# Patient Record
Sex: FEMALE | Race: WHITE | Employment: FULL TIME | URBAN - METROPOLITAN AREA
[De-identification: names, ages, dates, MRNs, and addresses within clinical notes are randomized per-mention and may not be internally consistent; named-entity substitution may affect disease eponyms.]

---

## 2018-05-29 ENCOUNTER — APPOINTMENT (OUTPATIENT)
Dept: GENERAL RADIOLOGY | Age: 33
End: 2018-05-29
Payer: COMMERCIAL

## 2018-05-29 ENCOUNTER — HOSPITAL ENCOUNTER (EMERGENCY)
Age: 33
Discharge: HOME | End: 2018-05-29
Payer: COMMERCIAL

## 2018-05-29 VITALS
HEART RATE: 82 BPM | HEIGHT: 70 IN | RESPIRATION RATE: 14 BRPM | TEMPERATURE: 98.2 F | WEIGHT: 199 LBS | OXYGEN SATURATION: 98 % | BODY MASS INDEX: 28.49 KG/M2 | SYSTOLIC BLOOD PRESSURE: 132 MMHG | DIASTOLIC BLOOD PRESSURE: 82 MMHG

## 2018-05-29 DIAGNOSIS — S63.502A LEFT WRIST SPRAIN, INITIAL ENCOUNTER: Primary | ICD-10-CM

## 2018-05-29 PROCEDURE — 73110 X-RAY EXAM OF WRIST: CPT

## 2018-05-29 PROCEDURE — 99283 EMERGENCY DEPT VISIT LOW MDM: CPT

## 2018-05-29 RX ORDER — NAPROXEN 500 MG/1
500 TABLET ORAL 2 TIMES DAILY
Qty: 10 TABLET | Refills: 0 | Status: SHIPPED | OUTPATIENT
Start: 2018-05-29 | End: 2018-11-10

## 2018-05-29 ASSESSMENT — PAIN DESCRIPTION - DESCRIPTORS: DESCRIPTORS: ACHING;CONSTANT;DISCOMFORT

## 2018-05-29 ASSESSMENT — PAIN DESCRIPTION - LOCATION: LOCATION: WRIST

## 2018-05-29 ASSESSMENT — PAIN DESCRIPTION - ORIENTATION: ORIENTATION: LEFT

## 2018-05-29 ASSESSMENT — PAIN DESCRIPTION - FREQUENCY: FREQUENCY: CONTINUOUS

## 2018-05-29 ASSESSMENT — PAIN SCALES - GENERAL: PAINLEVEL_OUTOF10: 4

## 2018-05-29 ASSESSMENT — PAIN DESCRIPTION - PAIN TYPE: TYPE: ACUTE PAIN

## 2018-10-26 ENCOUNTER — HOSPITAL ENCOUNTER (OUTPATIENT)
Dept: NON INVASIVE DIAGNOSTICS | Age: 33
Discharge: HOME | End: 2018-10-26
Payer: COMMERCIAL

## 2018-10-26 LAB
LV EF: 52 %
LVEF MODALITY: NORMAL

## 2018-10-26 PROCEDURE — 93306 TTE W/DOPPLER COMPLETE: CPT

## 2018-11-10 ENCOUNTER — APPOINTMENT (OUTPATIENT)
Dept: GENERAL RADIOLOGY | Age: 33
End: 2018-11-10
Payer: COMMERCIAL

## 2018-11-10 ENCOUNTER — HOSPITAL ENCOUNTER (EMERGENCY)
Age: 33
Discharge: HOME | End: 2018-11-10
Payer: COMMERCIAL

## 2018-11-10 VITALS
BODY MASS INDEX: 25.77 KG/M2 | TEMPERATURE: 98.5 F | HEART RATE: 90 BPM | RESPIRATION RATE: 16 BRPM | HEIGHT: 70 IN | DIASTOLIC BLOOD PRESSURE: 73 MMHG | WEIGHT: 180 LBS | OXYGEN SATURATION: 97 % | SYSTOLIC BLOOD PRESSURE: 119 MMHG

## 2018-11-10 DIAGNOSIS — S93.601A FOOT SPRAIN, RIGHT, INITIAL ENCOUNTER: Primary | ICD-10-CM

## 2018-11-10 PROCEDURE — 73630 X-RAY EXAM OF FOOT: CPT

## 2018-11-10 PROCEDURE — 99283 EMERGENCY DEPT VISIT LOW MDM: CPT

## 2018-11-10 RX ORDER — ALBUTEROL SULFATE 2.5 MG/3ML
2.5 SOLUTION RESPIRATORY (INHALATION) EVERY 6 HOURS PRN
COMMUNITY

## 2018-11-10 RX ORDER — NAPROXEN 500 MG/1
500 TABLET ORAL 2 TIMES DAILY WITH MEALS
Qty: 28 TABLET | Refills: 0 | Status: SHIPPED | OUTPATIENT
Start: 2018-11-10 | End: 2019-02-20

## 2018-11-10 ASSESSMENT — PAIN SCALES - GENERAL: PAINLEVEL_OUTOF10: 4

## 2018-11-10 ASSESSMENT — PAIN DESCRIPTION - ORIENTATION: ORIENTATION: RIGHT

## 2018-11-11 NOTE — ED PROVIDER NOTES
answered at this time and they are agreeable with the plan. Assessment      1. Foot sprain, right, initial encounter      Plan   Discharge to home  Patient condition is good    New Medications     Discharge Medication List as of 11/10/2018  8:38 PM        Electronically signed by LEIA Sage CNP   DD: 11/10/18  **This report was transcribed using voice recognition software. Every effort was made to ensure accuracy; however, inadvertent computerized transcription errors may be present.   END OF ED PROVIDER NOTE        LEIA Sage CNP  11/10/18 [id number]

## 2019-02-20 ENCOUNTER — HOSPITAL ENCOUNTER (EMERGENCY)
Age: 34
Discharge: HOME | End: 2019-02-20
Payer: COMMERCIAL

## 2019-02-20 ENCOUNTER — APPOINTMENT (OUTPATIENT)
Dept: GENERAL RADIOLOGY | Age: 34
End: 2019-02-20
Payer: COMMERCIAL

## 2019-02-20 VITALS
DIASTOLIC BLOOD PRESSURE: 58 MMHG | OXYGEN SATURATION: 98 % | HEART RATE: 76 BPM | RESPIRATION RATE: 18 BRPM | SYSTOLIC BLOOD PRESSURE: 105 MMHG | TEMPERATURE: 98.4 F

## 2019-02-20 DIAGNOSIS — S40.021A ARM CONTUSION, RIGHT, INITIAL ENCOUNTER: Primary | ICD-10-CM

## 2019-02-20 PROCEDURE — 73090 X-RAY EXAM OF FOREARM: CPT

## 2019-02-20 PROCEDURE — 99283 EMERGENCY DEPT VISIT LOW MDM: CPT

## 2019-02-20 RX ORDER — NAPROXEN 500 MG/1
500 TABLET ORAL 2 TIMES DAILY
Qty: 14 TABLET | Refills: 0 | Status: SHIPPED | OUTPATIENT
Start: 2019-02-20 | End: 2019-07-11

## 2019-02-20 ASSESSMENT — PAIN DESCRIPTION - ORIENTATION: ORIENTATION: RIGHT;LOWER

## 2019-02-20 ASSESSMENT — PAIN DESCRIPTION - PAIN TYPE: TYPE: ACUTE PAIN

## 2019-02-20 ASSESSMENT — PAIN SCALES - GENERAL: PAINLEVEL_OUTOF10: 4

## 2019-02-20 ASSESSMENT — PAIN DESCRIPTION - LOCATION: LOCATION: ARM

## 2019-05-24 ENCOUNTER — HOSPITAL ENCOUNTER (EMERGENCY)
Age: 34
Discharge: HOME | End: 2019-05-25
Payer: COMMERCIAL

## 2019-05-24 DIAGNOSIS — R06.4 HYPERVENTILATION: ICD-10-CM

## 2019-05-24 DIAGNOSIS — F41.1 ANXIETY STATE: Primary | ICD-10-CM

## 2019-05-24 PROCEDURE — 99283 EMERGENCY DEPT VISIT LOW MDM: CPT

## 2019-05-24 PROCEDURE — 96374 THER/PROPH/DIAG INJ IV PUSH: CPT

## 2019-05-24 PROCEDURE — 6360000002 HC RX W HCPCS: Performed by: NURSE PRACTITIONER

## 2019-05-24 RX ORDER — LORAZEPAM 2 MG/ML
1 INJECTION INTRAMUSCULAR ONCE
Status: COMPLETED | OUTPATIENT
Start: 2019-05-24 | End: 2019-05-24

## 2019-05-24 RX ADMIN — LORAZEPAM 1 MG: 2 INJECTION, SOLUTION INTRAMUSCULAR; INTRAVENOUS at 23:24

## 2019-05-24 ASSESSMENT — PAIN DESCRIPTION - PAIN TYPE: TYPE: ACUTE PAIN

## 2019-05-24 ASSESSMENT — PAIN DESCRIPTION - LOCATION: LOCATION: CHEST

## 2019-05-25 VITALS
DIASTOLIC BLOOD PRESSURE: 56 MMHG | SYSTOLIC BLOOD PRESSURE: 106 MMHG | RESPIRATION RATE: 16 BRPM | TEMPERATURE: 98.7 F | HEART RATE: 92 BPM | OXYGEN SATURATION: 96 % | BODY MASS INDEX: 25.83 KG/M2 | HEIGHT: 70 IN

## 2019-05-25 RX ORDER — LORAZEPAM 1 MG/1
1 TABLET ORAL EVERY 6 HOURS PRN
Qty: 12 TABLET | Refills: 0 | Status: SHIPPED | OUTPATIENT
Start: 2019-05-25 | End: 2019-05-28

## 2019-05-25 NOTE — ED NOTES
Significant other states she is having an anxiety attack.  very attentive and helping control her emotions.       Solo Lazar RN  05/24/19 3970

## 2019-05-25 NOTE — ED PROVIDER NOTES
calm down her respiratory rate returned to normal limits, vital signs are stable. She does verbalize that she feels much improved at this time, we'll discharge with Ativan by mouth and discharged to follow-up with primary doctor for further evaluation treatment treatment related to anxiety. Counseling: The emergency provider has spoken with the patient and discussed todays results, in addition to providing specific details for the plan of care and counseling regarding the diagnosis and prognosis. Questions are answered at this time and they are agreeable with the plan. Assessment      1. Anxiety state    2. Hyperventilation      Plan   Discharge to home  Patient condition is good    New Medications     New Prescriptions    LORAZEPAM (ATIVAN) 1 MG TABLET    Take 1 tablet by mouth every 6 hours as needed for Anxiety for up to 3 days. Electronically signed by LEIA Clay CNP   DD: 5/25/19  **This report was transcribed using voice recognition software. Every effort was made to ensure accuracy; however, inadvertent computerized transcription errors may be present. END OF ED PROVIDER NOTE        LEIA Clay CNP  05/25/19 0009    ATTENDING PROVIDER ATTESTATION:     Supervising Physician, on-site, available for consultation, non-participatory in the evaluation or care of this patient.            Brock Pascal DO  05/25/19 0599

## 2019-07-11 ENCOUNTER — HOSPITAL ENCOUNTER (EMERGENCY)
Age: 34
Discharge: HOME | End: 2019-07-11
Attending: EMERGENCY MEDICINE
Payer: COMMERCIAL

## 2019-07-11 VITALS
OXYGEN SATURATION: 98 % | HEART RATE: 86 BPM | TEMPERATURE: 99 F | WEIGHT: 170 LBS | SYSTOLIC BLOOD PRESSURE: 121 MMHG | HEIGHT: 70 IN | RESPIRATION RATE: 20 BRPM | DIASTOLIC BLOOD PRESSURE: 69 MMHG | BODY MASS INDEX: 24.34 KG/M2

## 2019-07-11 DIAGNOSIS — S16.1XXA ACUTE STRAIN OF NECK MUSCLE, INITIAL ENCOUNTER: Primary | ICD-10-CM

## 2019-07-11 PROCEDURE — 6360000002 HC RX W HCPCS: Performed by: EMERGENCY MEDICINE

## 2019-07-11 PROCEDURE — 6370000000 HC RX 637 (ALT 250 FOR IP): Performed by: EMERGENCY MEDICINE

## 2019-07-11 PROCEDURE — 96372 THER/PROPH/DIAG INJ SC/IM: CPT

## 2019-07-11 PROCEDURE — 99283 EMERGENCY DEPT VISIT LOW MDM: CPT

## 2019-07-11 RX ORDER — DEXAMETHASONE SODIUM PHOSPHATE 10 MG/ML
10 INJECTION INTRAMUSCULAR; INTRAVENOUS ONCE
Status: COMPLETED | OUTPATIENT
Start: 2019-07-11 | End: 2019-07-11

## 2019-07-11 RX ORDER — NAPROXEN 500 MG/1
500 TABLET ORAL 2 TIMES DAILY WITH MEALS
Qty: 40 TABLET | Refills: 0 | Status: SHIPPED | OUTPATIENT
Start: 2019-07-11 | End: 2021-09-21 | Stop reason: ALTCHOICE

## 2019-07-11 RX ORDER — ORPHENADRINE CITRATE 100 MG/1
100 TABLET, EXTENDED RELEASE ORAL 2 TIMES DAILY
Qty: 10 TABLET | Refills: 0 | Status: SHIPPED | OUTPATIENT
Start: 2019-07-11 | End: 2019-07-16

## 2019-07-11 RX ORDER — ALBUTEROL SULFATE 90 UG/1
2 AEROSOL, METERED RESPIRATORY (INHALATION) EVERY 6 HOURS PRN
COMMUNITY
End: 2021-09-21

## 2019-07-11 RX ORDER — FEXOFENADINE HCL AND PSEUDOEPHEDRINE HCI 180; 240 MG/1; MG/1
1 TABLET, EXTENDED RELEASE ORAL DAILY PRN
COMMUNITY
End: 2020-01-25 | Stop reason: ALTCHOICE

## 2019-07-11 RX ORDER — IBUPROFEN 800 MG/1
800 TABLET ORAL ONCE
Status: COMPLETED | OUTPATIENT
Start: 2019-07-11 | End: 2019-07-11

## 2019-07-11 RX ORDER — ORPHENADRINE CITRATE 30 MG/ML
60 INJECTION INTRAMUSCULAR; INTRAVENOUS ONCE
Status: COMPLETED | OUTPATIENT
Start: 2019-07-11 | End: 2019-07-11

## 2019-07-11 RX ORDER — ALBUTEROL SULFATE 90 UG/1
2 AEROSOL, METERED RESPIRATORY (INHALATION) 2 TIMES DAILY
COMMUNITY
End: 2021-09-21

## 2019-07-11 RX ORDER — LORAZEPAM 0.5 MG/1
0.5 TABLET ORAL DAILY PRN
COMMUNITY

## 2019-07-11 RX ORDER — LAMOTRIGINE 25 MG/1
25 TABLET ORAL NIGHTLY
COMMUNITY

## 2019-07-11 RX ADMIN — IBUPROFEN 800 MG: 800 TABLET, FILM COATED ORAL at 09:27

## 2019-07-11 RX ADMIN — DEXAMETHASONE SODIUM PHOSPHATE 10 MG: 10 INJECTION INTRAMUSCULAR; INTRAVENOUS at 08:43

## 2019-07-11 RX ADMIN — ORPHENADRINE CITRATE 60 MG: 30 INJECTION INTRAMUSCULAR; INTRAVENOUS at 08:43

## 2019-07-11 ASSESSMENT — ENCOUNTER SYMPTOMS
NAUSEA: 0
CONSTIPATION: 0
ABDOMINAL PAIN: 0
COUGH: 0
DIARRHEA: 0
SINUS PRESSURE: 0
RHINORRHEA: 0
SINUS PAIN: 0
SORE THROAT: 0
BACK PAIN: 0
WHEEZING: 0
PHOTOPHOBIA: 0
SHORTNESS OF BREATH: 0
VOMITING: 0

## 2019-07-11 ASSESSMENT — PAIN DESCRIPTION - LOCATION: LOCATION: NECK

## 2019-07-11 ASSESSMENT — PAIN SCALES - GENERAL
PAINLEVEL_OUTOF10: 4
PAINLEVEL_OUTOF10: 8
PAINLEVEL_OUTOF10: 8

## 2019-07-11 ASSESSMENT — PAIN DESCRIPTION - DESCRIPTORS: DESCRIPTORS: SHARP;PRESSURE

## 2019-07-11 ASSESSMENT — PAIN DESCRIPTION - FREQUENCY: FREQUENCY: CONTINUOUS

## 2019-07-11 ASSESSMENT — PAIN DESCRIPTION - PAIN TYPE: TYPE: ACUTE PAIN

## 2019-07-11 NOTE — ED PROVIDER NOTES
[id number] Patient states that she is feeling somewhat better after the Motrin. The patient has no concerning symptoms for any vascular or nerve injury at this time. She has no numbness or tingling of the extremities. She is neurologically intact. She has paraspinal muscle tenderness to palpation. She does have range of motion of the neck, just slower due to pain. No meningeal signs are present. The patient will be given naproxen and Norflex upon discharge. Patient will return if she has worsening symptoms. She is agreeable with this plan. [MS]      ED Course User Index  [MS] Claudia Lira, DO       --------------------------------------------- PAST HISTORY ---------------------------------------------  Past Medical History:  has a past medical history of Anemia, Depression with anxiety, Hypothyroid, and Thyroid disease. Past Surgical History:  has a past surgical history that includes Tonsillectomy; skin biopsy (cyst removed left wrist); Wrist Arthrocentesis; and esperanza and bso (cervix removed) (Bilateral, 10/17/2016). Social History:  reports that she quit smoking about 8 years ago. She has a 2.50 pack-year smoking history. She has never used smokeless tobacco. She reports that she drinks about 1.2 oz of alcohol per week. She reports that she does not use drugs. Family History: family history includes Arthritis in her mother; Diabetes in her mother; Heart Disease in her maternal grandfather and paternal grandfather; No Known Problems in her father. The patients home medications have been reviewed. Allergies: Patient has no known allergies. -------------------------------------------------- RESULTS -------------------------------------------------  Labs:  No results found for this visit on 07/11/19.     Radiology:  No orders to display       ------------------------- NURSING NOTES AND VITALS REVIEWED ---------------------------  Date / Time Roomed:  7/11/2019  8:15 AM  ED Bed Assignment:

## 2020-01-25 ENCOUNTER — HOSPITAL ENCOUNTER (EMERGENCY)
Age: 35
Discharge: HOME | End: 2020-01-25
Payer: COMMERCIAL

## 2020-01-25 ENCOUNTER — APPOINTMENT (OUTPATIENT)
Dept: GENERAL RADIOLOGY | Age: 35
End: 2020-01-25
Payer: COMMERCIAL

## 2020-01-25 VITALS
WEIGHT: 178 LBS | DIASTOLIC BLOOD PRESSURE: 65 MMHG | BODY MASS INDEX: 25.54 KG/M2 | OXYGEN SATURATION: 96 % | SYSTOLIC BLOOD PRESSURE: 98 MMHG | HEART RATE: 104 BPM | TEMPERATURE: 98.6 F | RESPIRATION RATE: 16 BRPM

## 2020-01-25 PROCEDURE — 99212 OFFICE O/P EST SF 10 MIN: CPT

## 2020-01-25 PROCEDURE — 71046 X-RAY EXAM CHEST 2 VIEWS: CPT

## 2020-01-25 RX ORDER — PREDNISONE 20 MG/1
40 TABLET ORAL DAILY
Qty: 10 TABLET | Refills: 0 | Status: SHIPPED | OUTPATIENT
Start: 2020-01-25 | End: 2020-01-30

## 2020-01-25 RX ORDER — FLUOXETINE HYDROCHLORIDE 20 MG/1
40 CAPSULE ORAL DAILY
COMMUNITY

## 2020-01-25 RX ORDER — BUSPIRONE HYDROCHLORIDE 10 MG/1
10 TABLET ORAL 2 TIMES DAILY
COMMUNITY

## 2020-01-25 RX ORDER — BENZONATATE 200 MG/1
200 CAPSULE ORAL 3 TIMES DAILY PRN
Qty: 21 CAPSULE | Refills: 0 | Status: SHIPPED | OUTPATIENT
Start: 2020-01-25 | End: 2020-02-01

## 2020-01-25 ASSESSMENT — PAIN SCALES - GENERAL: PAINLEVEL_OUTOF10: 5

## 2020-01-25 ASSESSMENT — PAIN DESCRIPTION - LOCATION: LOCATION: CHEST;BACK

## 2020-01-25 NOTE — ED PROVIDER NOTES
atraumatic  Eyes: clear  Mouth: Oropharynx clear, handling secretions, no trismus  Neck: Supple, full ROM, non tender to palpation in the midline, no stridor, no crepitus, no meningeal signs  Pulmonary: Lungs clear to auscultation bilaterally, no wheezes, rales, or rhonchi. Not in respiratory distress  Cardiovascular:  Regular rate. Regular rhythm. No murmurs, gallops, or rubs. 2+ distal pulse  Abdomen: Soft. Non tender. Non distended. Musculoskeletal: Moves all extremities x 4. Warm and well perfused, no clubbing, cyanosis, or edema. Capillary refill <3 seconds  Skin: warm and dry. No rashes. Neurologic: GCS 15  Psych: Normal Affect      -------------------------------------------------- RESULTS -------------------------------------------------    LABS:  No results found for this visit on 01/25/20. RADIOLOGY:  Interpreted by Radiologist.  XR CHEST STANDARD (2 VW)   Final Result   1. No acute cardiopulmonary disease.            ------------------------- NURSING NOTES AND VITALS REVIEWED ---------------------------   The nursing notes within the ED encounter and vital signs as below have been reviewed. BP 98/65   Pulse 104   Temp 98.6 °F (37 °C) (Oral)   Resp 16   Wt 178 lb (80.7 kg)   LMP 09/10/2016 (Approximate)   SpO2 96%   BMI 25.54 kg/m²   Oxygen Saturation Interpretation: Normal    The patients available past medical records and past encounters were reviewed. ------------------------------ ED COURSE/MEDICAL DECISION MAKING----------------------  Medications - No data to display          Medical Decision Making:    States that she has been achy and tired and has a slight cough for about a week but then last 3 days it seems to have gotten worse and she feels she has chest congestion and it feels tighter. She wondered 1 to make sure she did not have any pneumonia she is not complaining of shortness of breath.   She has been taking DayQuil and NyQuil.'s are totally clear her sats 96% I did do a chest x-ray and it was negative. Her blood pressure was rechecked it was 122/84, her heart rate is 104 but she does not have a fever does not appear septic she appears well and her exam is negative. Most likely she had and she had flulike symptoms over the past week she is too late for Tamiflu she does have a cough feels her chest is congested I did go ahead and put her on some prednisone and some Tessalon. I advised her to follow-up with her doctor if anything worsens she needs to get reevaluated  Counseling: The emergency provider has spoken with the patient and discussed todays results, in addition to providing specific details for the plan of care and counseling regarding the diagnosis and prognosis. Questions are answered at this time and they are agreeable with the plan.       --------------------------------- IMPRESSION AND DISPOSITION ---------------------------------    IMPRESSION  1. Flu-like symptoms    2.  Cough        DISPOSITION  Disposition: Discharge to home  Patient condition is good           LEIA Ferro CNP  01/25/20 1364

## 2020-07-15 ENCOUNTER — HOSPITAL ENCOUNTER (EMERGENCY)
Age: 35
Discharge: HOME | End: 2020-07-15
Payer: COMMERCIAL

## 2020-07-15 ENCOUNTER — HOSPITAL ENCOUNTER (EMERGENCY)
Age: 35
Discharge: HOME | End: 2020-07-15
Attending: EMERGENCY MEDICINE
Payer: COMMERCIAL

## 2020-07-15 VITALS
HEIGHT: 70 IN | WEIGHT: 178 LBS | BODY MASS INDEX: 25.48 KG/M2 | TEMPERATURE: 98.9 F | RESPIRATION RATE: 16 BRPM | SYSTOLIC BLOOD PRESSURE: 113 MMHG | HEART RATE: 96 BPM | OXYGEN SATURATION: 99 % | DIASTOLIC BLOOD PRESSURE: 68 MMHG

## 2020-07-15 LAB
HCG, URINE, POC: NEGATIVE
Lab: NORMAL
NEGATIVE QC PASS/FAIL: NORMAL
POSITIVE QC PASS/FAIL: NORMAL

## 2020-07-15 PROCEDURE — 99284 EMERGENCY DEPT VISIT MOD MDM: CPT

## 2020-07-15 PROCEDURE — 6370000000 HC RX 637 (ALT 250 FOR IP): Performed by: EMERGENCY MEDICINE

## 2020-07-15 PROCEDURE — 6360000002 HC RX W HCPCS: Performed by: EMERGENCY MEDICINE

## 2020-07-15 RX ORDER — KETOROLAC TROMETHAMINE 30 MG/ML
30 INJECTION, SOLUTION INTRAMUSCULAR; INTRAVENOUS ONCE
Status: COMPLETED | OUTPATIENT
Start: 2020-07-15 | End: 2020-07-15

## 2020-07-15 RX ORDER — HYDROCODONE BITARTRATE AND ACETAMINOPHEN 5; 325 MG/1; MG/1
1 TABLET ORAL ONCE
Status: COMPLETED | OUTPATIENT
Start: 2020-07-15 | End: 2020-07-15

## 2020-07-15 RX ORDER — ORPHENADRINE CITRATE 30 MG/ML
60 INJECTION INTRAMUSCULAR; INTRAVENOUS ONCE
Status: COMPLETED | OUTPATIENT
Start: 2020-07-15 | End: 2020-07-15

## 2020-07-15 RX ORDER — LIDOCAINE 50 MG/G
1 PATCH TOPICAL DAILY
Qty: 10 PATCH | Refills: 0 | Status: SHIPPED | OUTPATIENT
Start: 2020-07-15 | End: 2020-07-25

## 2020-07-15 RX ORDER — PREDNISONE 50 MG/1
50 TABLET ORAL DAILY
Qty: 5 TABLET | Refills: 0 | Status: SHIPPED | OUTPATIENT
Start: 2020-07-15 | End: 2020-07-20

## 2020-07-15 RX ORDER — DEXAMETHASONE 1 MG
TABLET ORAL
Status: DISCONTINUED
Start: 2020-07-15 | End: 2020-07-16 | Stop reason: HOSPADM

## 2020-07-15 RX ORDER — CYCLOBENZAPRINE HCL 5 MG
5 TABLET ORAL 2 TIMES DAILY PRN
Qty: 10 TABLET | Refills: 0 | Status: SHIPPED | OUTPATIENT
Start: 2020-07-15 | End: 2020-07-25

## 2020-07-15 RX ADMIN — KETOROLAC TROMETHAMINE 30 MG: 30 INJECTION, SOLUTION INTRAMUSCULAR; INTRAVENOUS at 21:08

## 2020-07-15 RX ADMIN — DEXAMETHASONE 10 MG: 4 TABLET ORAL at 21:07

## 2020-07-15 RX ADMIN — HYDROCODONE BITARTRATE AND ACETAMINOPHEN 1 TABLET: 5; 325 TABLET ORAL at 22:01

## 2020-07-15 RX ADMIN — ORPHENADRINE CITRATE 60 MG: 30 INJECTION INTRAMUSCULAR; INTRAVENOUS at 21:07

## 2020-07-15 ASSESSMENT — PAIN SCALES - GENERAL
PAINLEVEL_OUTOF10: 7
PAINLEVEL_OUTOF10: 7
PAINLEVEL_OUTOF10: 10
PAINLEVEL_OUTOF10: 7

## 2020-07-15 ASSESSMENT — PAIN DESCRIPTION - LOCATION
LOCATION: BACK
LOCATION: BACK

## 2020-07-15 ASSESSMENT — PAIN DESCRIPTION - ONSET: ONSET: ON-GOING

## 2020-07-15 ASSESSMENT — PAIN DESCRIPTION - PROGRESSION: CLINICAL_PROGRESSION: GRADUALLY IMPROVING

## 2020-07-15 ASSESSMENT — PAIN DESCRIPTION - ORIENTATION
ORIENTATION: LOWER
ORIENTATION: LOWER;MID

## 2020-07-15 ASSESSMENT — PAIN DESCRIPTION - DESCRIPTORS: DESCRIPTORS: CONSTANT;DISCOMFORT

## 2020-07-15 ASSESSMENT — PAIN DESCRIPTION - FREQUENCY: FREQUENCY: CONTINUOUS

## 2020-07-15 ASSESSMENT — ENCOUNTER SYMPTOMS
BACK PAIN: 1
COUGH: 0
ABDOMINAL PAIN: 0

## 2020-07-15 ASSESSMENT — PAIN DESCRIPTION - PAIN TYPE
TYPE: ACUTE PAIN
TYPE: ACUTE PAIN

## 2020-07-16 NOTE — ED NOTES
Was at a exercise facility and EMS stated she fell onto back and hurting left leg. She arrived by ambulance and placed into bed.       Mohit Lees RN  07/15/20 2027

## 2020-07-16 NOTE — ED NOTES
Was attempting sit up, while doing so had lower lumbar pain, left hip pain into left thigh and to lower thoracic area, making her fall backwards. Unable to get up independently with EMS. Arrived per squad and transferred patient over. When instructed for urine sample we were going to attempt to it to bedside to see how she was doing and was unable to get to sitting position with my help. We did place a bedpan for urine collection. Denies any numbness or tingling to extremity. Skin warm to touch. Pedal pulse strong to LLE.      Dong Call RN  07/15/20 6284

## 2020-07-16 NOTE — ED PROVIDER NOTES
This is a 38year old female who presents to the ED for evaluation of back pain. The patient states that earlier today she was at [employer] and was doing kick and sit ups. She states that after doing her sit ups she developed some acute pain to her right lower back. She denies any trauma or falls. She states that this felt like a pressure sensation and tear. She states that the pain was so sever she had to lay on the ground. She states that her pain is a 10/10. She has no nausea, no vomiting and no abdominal pain. She states that moving makes her pain worse. She states that she has no numbness, no loss of bladder incontinence. The patient has no fevers or chills. She has no use of IV drugs. The history is provided by the patient. No  was used. Review of Systems   Constitutional: Negative for fever. HENT: Negative for congestion. Eyes: Negative for visual disturbance. Respiratory: Negative for cough. Cardiovascular: Negative for chest pain. Gastrointestinal: Negative for abdominal pain. Endocrine: Negative for polyuria. Genitourinary: Negative for dysuria. Musculoskeletal: Positive for back pain. Skin: Negative for rash. Neurological: Negative for headaches. Hematological: Does not bruise/bleed easily. Psychiatric/Behavioral: Negative for confusion. Physical Exam  Vitals signs and nursing note reviewed. Constitutional:       General: She is not in acute distress. Appearance: She is well-developed. HENT:      Head: Normocephalic and atraumatic. Eyes:      Extraocular Movements: Extraocular movements intact. Neck:      Musculoskeletal: Normal range of motion and neck supple. Vascular: No JVD. Cardiovascular:      Rate and Rhythm: Normal rate and regular rhythm. Heart sounds: No murmur. Pulmonary:      Effort: Pulmonary effort is normal.      Breath sounds: No wheezing, rhonchi or rales.    Chest:      Chest wall: No tenderness. Abdominal:      General: There is no distension. Palpations: Abdomen is soft. Tenderness: There is no abdominal tenderness. There is no guarding. Hernia: No hernia is present. Musculoskeletal:      Right lower leg: No edema. Left lower leg: No edema. Comments: Right lower lumbar paraspinal hypertonicity and tenderness, limited ROM secondary to pain. No midline c-spine, t-spine or l-spine tenderness to palpation or stepoffs. No hip tenderness to palpation bilaterally or instability. Full ROM of bilateral upper and lower extremities   Skin:     General: Skin is warm and dry. Capillary Refill: Capillary refill takes less than 2 seconds. Neurological:      General: No focal deficit present. Mental Status: She is alert and oriented to person, place, and time. Cranial Nerves: No cranial nerve deficit. Psychiatric:         Mood and Affect: Mood normal.         Behavior: Behavior normal.          Procedures     MDM  Number of Diagnoses or Management Options  Acute left-sided low back pain, unspecified whether sciatica present:   Diagnosis management comments: Patient presented to the ED for evaluation of acute left low back pain after she was going [employer] and sit ups. The patient did appear quite uncomfortable and had pain that was reproducible to her left lower lumbar spine. She was treated with anti inflammatories, steroids and muscle relaxants. The patient did have improvement and was able to move around more. Patient had no red flag symptoms such as trauma, midline pain, incontinence or saddle anesthesia concerning for emergent pathology. She was appropriate for discharge and was given steroids and muscle relaxants for home. She was given strict return precautions.  She was agreeable to plan.                       --------------------------------------------- PAST HISTORY ---------------------------------------------  Past Medical History:  has a past medical providing specific details for the plan of care and counseling regarding the diagnosis and prognosis. Their questions are answered at this time and they are agreeable with the plan. I discussed at length with them reasons for immediate return here for re evaluation. They will followup with their PCP.      --------------------------------- ADDITIONAL PROVIDER NOTES ---------------------------------  At this time the patient is without objective evidence of an acute process requiring hospitalization or inpatient management. They have remained hemodynamically stable throughout their entire ED visit and are stable for discharge with outpatient follow-up. The plan has been discussed in detail and they are aware of the specific conditions for emergent return, as well as the importance of follow-up. Discharge Medication List as of 7/15/2020 10:10 PM      START taking these medications    Details   predniSONE (DELTASONE) 50 MG tablet Take 1 tablet by mouth daily for 5 days, Disp-5 tablet,R-0Print      cyclobenzaprine (FLEXERIL) 5 MG tablet Take 1 tablet by mouth 2 times daily as needed for Muscle spasms, Disp-10 tablet,R-0Print      lidocaine (LIDODERM) 5 % Place 1 patch onto the skin daily for 10 days 12 hours on, 12 hours off., Disp-10 patch,R-0Print             Diagnosis:  1. Acute left-sided low back pain, unspecified whether sciatica present        Disposition:  Patient's disposition: Discharge to home  Patient's condition is stable.      Harvie Hammans,   Resident  07/16/20 1002

## 2021-04-29 ENCOUNTER — APPOINTMENT (OUTPATIENT)
Dept: GENERAL RADIOLOGY | Age: 36
End: 2021-04-29
Payer: COMMERCIAL

## 2021-04-29 ENCOUNTER — HOSPITAL ENCOUNTER (EMERGENCY)
Age: 36
Discharge: HOME | End: 2021-04-29
Payer: COMMERCIAL

## 2021-04-29 VITALS
TEMPERATURE: 98 F | OXYGEN SATURATION: 95 % | SYSTOLIC BLOOD PRESSURE: 150 MMHG | DIASTOLIC BLOOD PRESSURE: 72 MMHG | RESPIRATION RATE: 18 BRPM | HEART RATE: 100 BPM

## 2021-04-29 DIAGNOSIS — S83.92XA SPRAIN OF LEFT KNEE, UNSPECIFIED LIGAMENT, INITIAL ENCOUNTER: Primary | ICD-10-CM

## 2021-04-29 PROCEDURE — 73562 X-RAY EXAM OF KNEE 3: CPT

## 2021-04-29 PROCEDURE — 99283 EMERGENCY DEPT VISIT LOW MDM: CPT

## 2021-04-29 ASSESSMENT — PAIN DESCRIPTION - ORIENTATION: ORIENTATION: LEFT

## 2021-04-29 ASSESSMENT — PAIN DESCRIPTION - PROGRESSION: CLINICAL_PROGRESSION: NOT CHANGED

## 2021-04-29 ASSESSMENT — PAIN DESCRIPTION - DESCRIPTORS: DESCRIPTORS: ACHING

## 2021-04-29 ASSESSMENT — ENCOUNTER SYMPTOMS
SHORTNESS OF BREATH: 0
COUGH: 0
BACK PAIN: 0
CHEST TIGHTNESS: 0
COLOR CHANGE: 0

## 2021-04-29 ASSESSMENT — PAIN DESCRIPTION - FREQUENCY: FREQUENCY: CONTINUOUS

## 2021-04-29 ASSESSMENT — PAIN DESCRIPTION - LOCATION: LOCATION: KNEE

## 2021-04-29 ASSESSMENT — PAIN DESCRIPTION - PAIN TYPE: TYPE: ACUTE PAIN

## 2021-04-29 NOTE — ED NOTES
FIRST PROVIDER CONTACT ASSESSMENT NOTE      Department of Emergency Medicine   Admit Date: No admission date for patient encounter.     Chief Complaint: Knee Pain (left knee, fall in shower 2 days ago)      History of Present Illness:    Micheal Johnson is a 38 y.o. female who presents to the ED for left knee pain s/p fall          -----------------END OF FIRST PROVIDER CONTACT ASSESSMENT NOTE--------------  Electronically signed by Cathy Calero PA-C   DD: 4/29/21               Cathy Calero PA-C  04/29/21 1828

## 2021-04-30 NOTE — ED PROVIDER NOTES
[facility]        Department of Emergency Medicine   ED  Provider Note  Admit Date/RoomTime: 4/29/2021  7:35 PM  ED Room: [place] [address]/INT-02  HPI:  4/29/21, Time: 8:16 PM EDT      The patient is a 48-year-old female presenting to the emergency department pain and swelling to her left knee. Patient states she slipped and fell out of the shower landing directly on her left knee and hitting the back of it off the edge of the shower about 2 days ago. She has had pain to the back of her knee that radiates into the front. The pain is worse with ambulating. She has taken Tylenol and ibuprofen without much relief. Patient denies hitting her head or any LOC. She also denies any significant swelling, redness, warmth, numbness or tingling, neck or back pain. The history is provided by the patient. No  was used. REVIEW OF SYSTEMS:  Review of Systems   Constitutional: Negative for activity change, chills, fatigue and fever. Respiratory: Negative for cough, chest tightness and shortness of breath. Cardiovascular: Negative for chest pain, palpitations and leg swelling. Musculoskeletal: Positive for arthralgias. Negative for back pain, joint swelling, myalgias, neck pain and neck stiffness. Skin: Negative for color change, pallor and rash. Neurological: Negative for dizziness, weakness, light-headedness and headaches. Psychiatric/Behavioral: Negative for agitation, behavioral problems and confusion. Pertinent positives and negatives are stated within HPI, all other systems reviewed and are negative.      --------------------------------------------- PAST HISTORY ---------------------------------------------  Past Medical History:  has a past medical history of Anemia, Asthma, Depression with anxiety, Hypothyroid, and Thyroid disease. Past Surgical History:  has a past surgical history that includes Tonsillectomy; skin biopsy (cyst removed left wrist);  Wrist Cardiovascular:      Rate and Rhythm: Normal rate and regular rhythm. Heart sounds: Normal heart sounds. No murmur. Pulmonary:      Effort: Pulmonary effort is normal. No respiratory distress. Breath sounds: Normal breath sounds. Musculoskeletal: Normal range of motion. General: Tenderness present. No deformity. Comments: Tenderness over the anterior left patella. Aging ecchymosis to the lateral inferior knee. Full range of motion of the knee. No crepitus or deformity. No significant edema. Skin:     General: Skin is warm and dry. Capillary Refill: Capillary refill takes less than 2 seconds. Coloration: Skin is not pale. Findings: No erythema. Neurological:      Mental Status: She is alert and oriented to person, place, and time. Mental status is at baseline. Motor: No weakness. Psychiatric:         Mood and Affect: Mood normal.         Behavior: Behavior normal.         Thought Content: Thought content normal.            ------------------------------ ED COURSE/MEDICAL DECISION MAKING----------------------  Medications - No data to display      ED COURSE:      XR KNEE LEFT (3 VIEWS)   Final Result   No acute abnormality of the knee. Procedures:  Procedures     Medical Decision Making:   MDM   77-year-old female presenting to the ED with an injury to her left knee after falling 2 days ago. Patient has no signs of neurovascular compromise. X-ray shows no acute deformity, fracture or dislocation. This is likely knee sprain/contusion. Patient placed in an Ace wrap. She is encouraged to follow-up with orthopedics and educated on rice. She is to return with any new or worsening symptoms. Counseling: The emergency provider has spoken with the patient and discussed todays results, in addition to providing specific details for the plan of care and counseling regarding the diagnosis and prognosis.   Questions are answered at this time and

## 2021-09-12 ENCOUNTER — HOSPITAL ENCOUNTER (EMERGENCY)
Age: 36
Discharge: HOME | End: 2021-09-12
Payer: COMMERCIAL

## 2021-09-12 VITALS
BODY MASS INDEX: 28.7 KG/M2 | TEMPERATURE: 98.4 F | RESPIRATION RATE: 16 BRPM | DIASTOLIC BLOOD PRESSURE: 78 MMHG | WEIGHT: 200 LBS | OXYGEN SATURATION: 97 % | HEART RATE: 100 BPM | SYSTOLIC BLOOD PRESSURE: 118 MMHG

## 2021-09-12 DIAGNOSIS — J20.9 ACUTE BRONCHITIS, UNSPECIFIED ORGANISM: Primary | ICD-10-CM

## 2021-09-12 DIAGNOSIS — J06.9 ACUTE UPPER RESPIRATORY INFECTION: ICD-10-CM

## 2021-09-12 PROCEDURE — 99211 OFF/OP EST MAY X REQ PHY/QHP: CPT

## 2021-09-12 RX ORDER — AMOXICILLIN AND CLAVULANATE POTASSIUM 875; 125 MG/1; MG/1
1 TABLET, FILM COATED ORAL 2 TIMES DAILY
Qty: 14 TABLET | Refills: 0 | Status: SHIPPED | OUTPATIENT
Start: 2021-09-12 | End: 2021-09-19

## 2021-09-12 RX ORDER — BROMPHENIRAMINE MALEATE, PSEUDOEPHEDRINE HYDROCHLORIDE, AND DEXTROMETHORPHAN HYDROBROMIDE 2; 30; 10 MG/5ML; MG/5ML; MG/5ML
10 SYRUP ORAL 3 TIMES DAILY PRN
Qty: 120 ML | Refills: 0 | Status: SHIPPED | OUTPATIENT
Start: 2021-09-12 | End: 2021-09-21 | Stop reason: ALTCHOICE

## 2021-09-12 RX ORDER — PREDNISONE 20 MG/1
TABLET ORAL
Qty: 8 TABLET | Refills: 0 | Status: SHIPPED | OUTPATIENT
Start: 2021-09-12 | End: 2021-09-21 | Stop reason: ALTCHOICE

## 2021-09-12 NOTE — ED PROVIDER NOTES
[id number]  Department of Emergency Medicine  UC Encounter Note  21   2:28 PM EDT      NAME: Jamarcus Posey  :  1985  MRN:  [id number]    Chief Complaint: Sinusitis (cough, congestion, drainage, sore throat, feels like its not open all the way, stuffy, started yesterday, feels jittery)      This is a 65-year-old female the presents to urgent care complaining of sinus and upper respiratory symptoms for the past several days. There has been some cough and congestion and sinus drainage. Also complains of a sore throat. On first contact patient she appears to be in no acute distress. Review of Systems  Pertinent positives and negatives are stated within HPI, all other systems reviewed and are negative. Physical Exam  Vitals and nursing note reviewed. Constitutional:       Appearance: She is well-developed. HENT:      Head: Normocephalic and atraumatic. Jaw: There is normal jaw occlusion. Right Ear: Hearing and external ear normal. Tympanic membrane is bulging. Left Ear: Hearing and external ear normal. Tympanic membrane is bulging. Nose: Congestion and rhinorrhea present. Mouth/Throat:      Pharynx: Uvula midline. No pharyngeal swelling, oropharyngeal exudate or uvula swelling. Eyes:      General: Lids are normal.      Conjunctiva/sclera: Conjunctivae normal.      Pupils: Pupils are equal, round, and reactive to light. Cardiovascular:      Rate and Rhythm: Normal rate and regular rhythm. Heart sounds: Normal heart sounds. No murmur heard. Pulmonary:      Effort: Pulmonary effort is normal.      Breath sounds: Normal breath sounds. Abdominal:      General: Bowel sounds are normal.      Palpations: Abdomen is soft. Abdomen is not rigid. Tenderness: There is no abdominal tenderness. There is no guarding or rebound. Musculoskeletal:      Cervical back: Normal range of motion and neck supple.    Skin:     General: Skin is warm and dry. Findings: No abrasion or rash. Neurological:      Mental Status: She is alert and oriented to person, place, and time. GCS: GCS eye subscore is 4. GCS verbal subscore is 5. GCS motor subscore is 6. Cranial Nerves: Cranial nerves are intact. No cranial nerve deficit. Sensory: Sensation is intact. No sensory deficit. Motor: Motor function is intact. Coordination: Coordination is intact. Coordination normal.      Gait: Gait is intact. Gait normal.         Procedures    MDM  Number of Diagnoses or Management Options  Acute bronchitis, unspecified organism  Acute upper respiratory infection  Diagnosis management comments: No acute distress. Placed on medications. Instructions given.           --------------------------------------------- PAST HISTORY ---------------------------------------------  Past Medical History:  has a past medical history of Anemia, Asthma, Depression with anxiety, Hussain-Danlos syndrome, Hypothyroid, and Thyroid disease. Past Surgical History:  has a past surgical history that includes Tonsillectomy; skin biopsy (cyst removed left wrist); Wrist Arthrocentesis; and esperanza and bso (cervix removed) (Bilateral, 10/17/2016). Social History:  reports that she quit smoking about 10 years ago. She has a 2.50 pack-year smoking history. She has never used smokeless tobacco. She reports current alcohol use of about 2.0 standard drinks of alcohol per week. She reports that she does not use drugs. Family History: family history includes Arthritis in her mother; Diabetes in her mother; Heart Disease in her maternal grandfather and paternal grandfather; No Known Problems in her father. The patients home medications have been reviewed. Allergies: Patient has no known allergies. -------------------------------------------------- RESULTS -------------------------------------------------  No results found for this visit on 09/12/21.   No orders to display       ------------------------- NURSING NOTES AND VITALS REVIEWED ---------------------------   The nursing notes within the ED encounter and vital signs as below have been reviewed. /78   Pulse 100   Temp 98.4 °F (36.9 °C)   Resp 16   Wt 200 lb (90.7 kg)   LMP 09/10/2016 (Approximate)   SpO2 97%   BMI 28.70 kg/m²   Oxygen Saturation Interpretation: Normal      ------------------------------------------ PROGRESS NOTES ------------------------------------------   I have spoken with the patient and discussed todays results, in addition to providing specific details for the plan of care and counseling regarding the diagnosis and prognosis. Their questions are answered at this time and they are agreeable with the plan.      --------------------------------- ADDITIONAL PROVIDER NOTES ---------------------------------     This patient is stable for discharge. I have shared the specific conditions for return, as well as the importance of follow-up. * NOTE: This report was transcribed using voice recognition software. Every effort was made to ensure accuracy; however, inadvertent computerized transcription errors may be present.    --------------------------------- IMPRESSION AND DISPOSITION ---------------------------------    IMPRESSION  1. Acute bronchitis, unspecified organism    2.  Acute upper respiratory infection        DISPOSITION  Disposition: Discharge to home  Patient condition is good       Joshua López PA-C  09/12/21 7998

## 2021-09-21 ENCOUNTER — APPOINTMENT (OUTPATIENT)
Dept: GENERAL RADIOLOGY | Age: 36
End: 2021-09-21
Payer: COMMERCIAL

## 2021-09-21 ENCOUNTER — HOSPITAL ENCOUNTER (EMERGENCY)
Age: 36
Discharge: HOME | End: 2021-09-21
Payer: COMMERCIAL

## 2021-09-21 VITALS
TEMPERATURE: 98.7 F | WEIGHT: 214 LBS | OXYGEN SATURATION: 98 % | HEIGHT: 70 IN | RESPIRATION RATE: 20 BRPM | BODY MASS INDEX: 30.64 KG/M2 | DIASTOLIC BLOOD PRESSURE: 74 MMHG | SYSTOLIC BLOOD PRESSURE: 111 MMHG | HEART RATE: 100 BPM

## 2021-09-21 DIAGNOSIS — J20.9 ACUTE BRONCHITIS, UNSPECIFIED ORGANISM: Primary | ICD-10-CM

## 2021-09-21 DIAGNOSIS — Z11.52 ENCOUNTER FOR SCREENING FOR COVID-19: ICD-10-CM

## 2021-09-21 PROCEDURE — 71046 X-RAY EXAM CHEST 2 VIEWS: CPT

## 2021-09-21 PROCEDURE — U0005 INFEC AGEN DETEC AMPLI PROBE: HCPCS

## 2021-09-21 PROCEDURE — 99211 OFF/OP EST MAY X REQ PHY/QHP: CPT

## 2021-09-21 PROCEDURE — U0003 INFECTIOUS AGENT DETECTION BY NUCLEIC ACID (DNA OR RNA); SEVERE ACUTE RESPIRATORY SYNDROME CORONAVIRUS 2 (SARS-COV-2) (CORONAVIRUS DISEASE [COVID-19]), AMPLIFIED PROBE TECHNIQUE, MAKING USE OF HIGH THROUGHPUT TECHNOLOGIES AS DESCRIBED BY CMS-2020-01-R: HCPCS

## 2021-09-21 RX ORDER — GUAIFENESIN AND CODEINE PHOSPHATE 100; 10 MG/5ML; MG/5ML
10 SOLUTION ORAL 2 TIMES DAILY PRN
Qty: 60 ML | Refills: 0 | Status: SHIPPED | OUTPATIENT
Start: 2021-09-21 | End: 2021-09-24

## 2021-09-21 RX ORDER — BENZONATATE 200 MG/1
200 CAPSULE ORAL 3 TIMES DAILY PRN
Qty: 15 CAPSULE | Refills: 0 | Status: SHIPPED | OUTPATIENT
Start: 2021-09-21

## 2021-09-21 RX ORDER — ALBUTEROL SULFATE 90 UG/1
2 AEROSOL, METERED RESPIRATORY (INHALATION) EVERY 6 HOURS PRN
Qty: 1 EACH | Refills: 0 | Status: SHIPPED | OUTPATIENT
Start: 2021-09-21

## 2021-09-21 ASSESSMENT — PAIN DESCRIPTION - LOCATION: LOCATION: GENERALIZED;CHEST

## 2021-09-21 ASSESSMENT — PAIN DESCRIPTION - DESCRIPTORS: DESCRIPTORS: ACHING;SORE

## 2021-09-21 ASSESSMENT — PAIN DESCRIPTION - ONSET: ONSET: GRADUAL

## 2021-09-21 ASSESSMENT — PAIN DESCRIPTION - PAIN TYPE: TYPE: ACUTE PAIN

## 2021-09-21 ASSESSMENT — PAIN SCALES - GENERAL: PAINLEVEL_OUTOF10: 5

## 2021-09-21 ASSESSMENT — PAIN DESCRIPTION - PROGRESSION: CLINICAL_PROGRESSION: GRADUALLY WORSENING

## 2021-09-21 ASSESSMENT — PAIN DESCRIPTION - FREQUENCY: FREQUENCY: CONTINUOUS

## 2021-09-21 NOTE — ED PROVIDER NOTES
[address] [facility]  Department of Emergency Medicine  UC Encounter Note  21   1:26 PM EDT      NAME: Taylor Coulter  :  1985  MRN:  [id number]    Chief Complaint: Nasal Congestion (States she was seen here on 2021 and treated for Bronchitis. States she finished her medications but is not feeling better.), Cough, Chest Congestion, Generalized Body Aches, and Shortness of Breath      HPI    Review of Systems  Pertinent positives and negatives are stated within HPI, all other systems reviewed and are negative. Physical Exam  Vitals and nursing note reviewed. Constitutional:       Appearance: She is well-developed. HENT:      Head: Normocephalic and atraumatic. Right Ear: Hearing and external ear normal.      Left Ear: Hearing and external ear normal.      Nose: Nose normal.      Mouth/Throat:      Pharynx: Uvula midline. Eyes:      General: Lids are normal.      Conjunctiva/sclera: Conjunctivae normal.      Pupils: Pupils are equal, round, and reactive to light. Cardiovascular:      Rate and Rhythm: Normal rate and regular rhythm. Heart sounds: Normal heart sounds. No murmur heard. Pulmonary:      Effort: Pulmonary effort is normal.      Breath sounds: Normal breath sounds. Abdominal:      General: Bowel sounds are normal.      Palpations: Abdomen is soft. Abdomen is not rigid. Tenderness: There is no abdominal tenderness. There is no guarding or rebound. Musculoskeletal:      Cervical back: Normal range of motion and neck supple. Skin:     General: Skin is warm and dry. Findings: No abrasion or rash. Neurological:      Mental Status: She is alert and oriented to person, place, and time. GCS: GCS eye subscore is 4. GCS verbal subscore is 5. GCS motor subscore is 6. Cranial Nerves: No cranial nerve deficit. Sensory: No sensory deficit.       Coordination: Coordination normal.      Gait: Gait normal. Procedures    MDM  Number of Diagnoses or Management Options  Acute bronchitis, unspecified organism  Encounter for screening for COVID-19  Diagnosis management comments: Xray reviewed  covid pending  meds discussed  Note for work  Instructions given.            --------------------------------------------- PAST HISTORY ---------------------------------------------  Past Medical History:  has a past medical history of Anemia, Asthma, Depression with anxiety, Hussain-Danlos syndrome, Hypothyroid, and Thyroid disease. Past Surgical History:  has a past surgical history that includes Tonsillectomy; skin biopsy (cyst removed left wrist); Wrist Arthrocentesis; esperanza and bso (cervix removed) (Bilateral, 10/17/2016); and Hysterectomy. Social History:  reports that she quit smoking about 10 years ago. She has a 2.50 pack-year smoking history. She has never used smokeless tobacco. She reports current alcohol use of about 2.0 standard drinks of alcohol per week. She reports that she does not use drugs. Family History: family history includes Arthritis in her mother; Diabetes in her mother; Heart Disease in her maternal grandfather and paternal grandfather; No Known Problems in her father. The patients home medications have been reviewed. Allergies: Patient has no known allergies. -------------------------------------------------- RESULTS -------------------------------------------------  No results found for this visit on 09/21/21. XR CHEST (2 VW)   Final Result   No pneumonia or pleural effusion.             ------------------------- NURSING NOTES AND VITALS REVIEWED ---------------------------   The nursing notes within the ED encounter and vital signs as below have been reviewed.    /74   Pulse 100   Temp 98.7 °F (37.1 °C) (Infrared)   Resp 20   Ht 5' 10\" (1.778 m)   Wt 214 lb (97.1 kg)   LMP 09/10/2016 (Approximate)   SpO2 98%   BMI 30.71 kg/m²   Oxygen Saturation Interpretation: Normal      ------------------------------------------ PROGRESS NOTES ------------------------------------------   I have spoken with the patient and discussed todays results, in addition to providing specific details for the plan of care and counseling regarding the diagnosis and prognosis. Their questions are answered at this time and they are agreeable with the plan.      --------------------------------- ADDITIONAL PROVIDER NOTES ---------------------------------     This patient is stable for discharge. I have shared the specific conditions for return, as well as the importance of follow-up. * NOTE: This report was transcribed using voice recognition software. Every effort was made to ensure accuracy; however, inadvertent computerized transcription errors may be present.    --------------------------------- IMPRESSION AND DISPOSITION ---------------------------------    IMPRESSION  1. Acute bronchitis, unspecified organism    2.  Encounter for screening for COVID-19        DISPOSITION  Disposition: Discharge to home  Patient condition is good       Rocky Borjas PA-C  09/21/21 5088

## 2021-09-23 LAB — SARS-COV-2, PCR: NOT DETECTED

## 2023-03-01 ENCOUNTER — APPOINTMENT (OUTPATIENT)
Dept: GENERAL RADIOLOGY | Age: 38
End: 2023-03-01
Payer: MEDICAID

## 2023-03-01 ENCOUNTER — HOSPITAL ENCOUNTER (EMERGENCY)
Age: 38
Discharge: HOME | End: 2023-03-01
Payer: MEDICAID

## 2023-03-01 VITALS
RESPIRATION RATE: 18 BRPM | WEIGHT: 187 LBS | HEART RATE: 93 BPM | OXYGEN SATURATION: 99 % | BODY MASS INDEX: 26.83 KG/M2 | TEMPERATURE: 97.7 F | SYSTOLIC BLOOD PRESSURE: 128 MMHG | DIASTOLIC BLOOD PRESSURE: 58 MMHG

## 2023-03-01 DIAGNOSIS — S49.92XA INJURY OF LEFT UPPER EXTREMITY, INITIAL ENCOUNTER: Primary | ICD-10-CM

## 2023-03-01 DIAGNOSIS — M79.643 TENDERNESS OF ANATOMICAL SNUFFBOX: ICD-10-CM

## 2023-03-01 DIAGNOSIS — S69.92XA INJURY OF LEFT WRIST, INITIAL ENCOUNTER: ICD-10-CM

## 2023-03-01 PROCEDURE — 6370000000 HC RX 637 (ALT 250 FOR IP)

## 2023-03-01 PROCEDURE — 29125 APPL SHORT ARM SPLINT STATIC: CPT

## 2023-03-01 PROCEDURE — 73090 X-RAY EXAM OF FOREARM: CPT

## 2023-03-01 PROCEDURE — 73130 X-RAY EXAM OF HAND: CPT

## 2023-03-01 PROCEDURE — 73110 X-RAY EXAM OF WRIST: CPT

## 2023-03-01 PROCEDURE — 99283 EMERGENCY DEPT VISIT LOW MDM: CPT

## 2023-03-01 RX ORDER — IBUPROFEN 600 MG/1
600 TABLET ORAL ONCE
Status: COMPLETED | OUTPATIENT
Start: 2023-03-01 | End: 2023-03-01

## 2023-03-01 RX ORDER — IBUPROFEN 600 MG/1
600 TABLET ORAL 3 TIMES DAILY PRN
Qty: 30 TABLET | Refills: 0 | Status: SHIPPED | OUTPATIENT
Start: 2023-03-01

## 2023-03-01 RX ADMIN — IBUPROFEN 600 MG: 600 TABLET ORAL at 18:25

## 2023-03-01 ASSESSMENT — LIFESTYLE VARIABLES: HOW OFTEN DO YOU HAVE A DRINK CONTAINING ALCOHOL: MONTHLY OR LESS

## 2023-03-01 ASSESSMENT — PAIN SCALES - GENERAL
PAINLEVEL_OUTOF10: 5
PAINLEVEL_OUTOF10: 7

## 2023-03-01 ASSESSMENT — PAIN DESCRIPTION - ORIENTATION: ORIENTATION: LEFT

## 2023-03-01 ASSESSMENT — PAIN DESCRIPTION - FREQUENCY: FREQUENCY: CONTINUOUS

## 2023-03-01 ASSESSMENT — PAIN - FUNCTIONAL ASSESSMENT: PAIN_FUNCTIONAL_ASSESSMENT: 0-10

## 2023-03-01 ASSESSMENT — PAIN DESCRIPTION - LOCATION: LOCATION: ARM;HAND

## 2023-03-01 NOTE — DISCHARGE INSTR - COC
Continuity of Care Form    Patient Name: Kaylee Abernathy   :  1985  MRN:  [id number]    [address] [place] [address] date:  3/1/2023  Discharge date:  ***    Code Status Order: Prior   Advance Directives:     Admitting Physician:  No admitting provider for patient encounter.   PCP: Lizbeth Wasserman DO    Discharging Nurse: [facility] Unit/Room#: [id number]/INT-03  Discharging Unit Phone Number: ***    Emergency Contact:   Extended Emergency Contact Information  Primary Emergency Contact: Wang Simmons [address] Phone: [phone]  Mobile Phone: [phone]  Relation: Domestic Partner  Secondary Emergency Contact: Gissel Baker  Address: Aileen Whitley[address] Phone: [phone]  Relation: Other    Past Surgical History:  Past Surgical History:   Procedure Laterality Date    HYSTERECTOMY      LIVER TRANSPLANTATION      SKIN BIOPSY  cyst removed left wrist    TONSILLECTOMY      TOTAL ABDOMINAL HYSTERECTOMY W/ BILATERAL SALPINGOOPHORECTOMY Bilateral 10/17/2016    Robotic assisted       Immunization History:   Immunization History   Administered Date(s) Administered    Tdap (Boostrix, Adacel) 07/10/2013, 2015       Active Problems:  Patient Active Problem List   Diagnosis Code    H/O gestational diabetes in prior pregnancy, currently pregnant O09.299, Z86.32    High-risk pregnancy O09.90    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    H/O  delivery, currently pregnant O09.899    Hypothyroid in pregnancy, antepartum O99.280, E03.9    Anemia affecting pregnancy in third trimester O99.013    UTI (urinary tract infection) during pregnancy O23.40       Isolation/Infection:   Isolation            No Isolation          Patient Infection Status       Infection Onset Added Last Indicated Last Indicated By Review Planned Expiration Resolved Resolved By    None active    Resolved    COVID-19 (Rule Out) 21 COVID-19 (Ordered)   21 Rule-Out Test Resulted            Nurse Assessment:  Last Vital Signs: BP (!) 128/58   Pulse 93   Temp 97.7 °F (36.5 °C)   Resp 18   Wt 187 lb (84.8 kg)   LMP 09/10/2016 (Approximate)   SpO2 99%   BMI 26.83 kg/m²     Last documented pain score (0-10 scale): Pain Level: 7  Last Weight:   Wt Readings from Last 1 Encounters:   23 187 lb (84.8 kg)     Mental Status:  {IP PT MENTAL STATUS:}    IV Access:  { NAN IV ACCESS:115863533}    Nursing Mobility/ADLs:  Walking   {CHP DME QWHH:655893112}  Transfer  {CHP DME ODQW:642489071}  Bathing  {CHP DME MARIALUISA:007387267}  Dressing  {CHP DME MJAH:047210002}  Toileting  {CHP DME JGAO:075023039}  Feeding  {CHP DME IMM}  Med Admin  {CHP DME JMXO:012710040}  Med Delivery   { NAN MED Delivery:814945276}    Wound Care Documentation and Therapy:  Incision 10/10/16 Abdomen (Active)   Number of days: 3920        Elimination:  Continence: Bowel: {YES / :48391}  Bladder: {YES / YA:36229}  Urinary Catheter: {Urinary Catheter:130526507}   Colostomy/Ileostomy/Ileal Conduit: {YES / TU:93432}       Date of Last BM: ***  No intake or output data in the 24 hours ending 23 1814  No intake/output data recorded.     Safety Concerns:     [facility] Safety Concerns:249402502}    Impairments/Disabilities:      [facility] Impairments/Disabilities:341438024}    Nutrition Therapy:  Current Nutrition Therapy:   [facility] Diet List:889347164}    Routes of Feeding: {CHP DME Other Feedings:999547324}  Liquids: {Slp liquid thickness:77261}  Daily Fluid Restriction: {CHP DME Yes amt example:131622394}  Last Modified Barium Swallow with Video (Video Swallowing Test): {Done Not Done FLCS:438329061}    Treatments at the Time of Hospital Discharge:   Respiratory Treatments: ***  Oxygen Therapy:  {Therapy; copd oxygen:92422}  Ventilator:    { CC Vent ACTD:824222228}    Rehab Therapies: {THERAPEUTIC INTERVENTION:8204289371}  Weight Bearing Status/Restrictions: [address] [facility] CC Weight Bearin}  Other Medical Equipment (for information only, NOT a DME order):  {EQUIPMENT:891992727}  Other Treatments: ***    Patient's personal belongings (please select all that are sent with patient):  {CHP DME Belongings:296918504}    RN SIGNATURE:  {Esignature:600027978}    CASE MANAGEMENT/SOCIAL WORK SECTION    Inpatient Status Date: ***    Readmission Risk Assessment Score:  Readmission Risk              Risk of Unplanned Readmission:  0           Discharging to Facility/ Agency   Name:   Address:  Phone:  Fax:    Dialysis Facility (if applicable)   Name:  Address:  Dialysis Schedule:  Phone:  Fax:    / signature: {Esignature:205356570}    PHYSICIAN SECTION    Prognosis: {Prognosis:5915151986}    Condition at Discharge: Mg [facility] Patient Condition:369769366}    Rehab Potential (if transferring to Rehab): {Prognosis:9887737649}    Recommended Labs or Other Treatments After Discharge: ***    Physician Certification: I certify the above information and transfer of Jennifer Espinoza  is necessary for the continuing treatment of the diagnosis listed and that she requires {Admit to Appropriate Level of Care:19983} for {GREATER/LESS:880259047} 30 days.      Update Admission H&P: {CHP DME Changes in BMSJI:331925315}    PHYSICIAN SIGNATURE:  {Esignature:098611315}

## 2023-03-01 NOTE — ED PROVIDER NOTES
Independent [facility] Visit. [facility]  Department of Emergency Medicine   ED  Encounter Note  Admit Date/RoomTime: 3/1/2023  6:00 PM  ED Room:     NAME: Maximus Taylor  : 1985  MRN: [id number]     Chief Complaint:  Fall and Hand Injury (Fall this am tripped / inj to lt hand arm )    History of Present Illness       Maximus Taylor is a 44 y.o. old female who presents to the emergency department by private vehicle, for traumatic Left hand, wrist, and forearm pain which occured several hour(s) prior to arrival.  The complaint is due to a fall down stairs while at home. Patient has no prior history of pain/injury with regards to today's visit. She is right handed. The patients tetanus status is up to date. Since onset the symptoms have been persistent. Her pain is aggraveated by certain movements or pressure on or palpation of painful area and relieved by nothing, as no treatment has been provided prior to this visit. She denies any head injury, headache, loss of consciousness, confusion, dizziness, neck pain, chest pain, abdominal pain, back pain, numbness, weakness, nausea, vomiting, fever, chills, or wounds. Patient stated that she was walking down her steps when she missed the last step and fell backwards and landed onto an outstretched left hand. Patient states that throughout the day she has continued to have increased pain in the left wrist, forearm and hand. Denies any head injury or loss of consciousness. Patient is not on any blood thinning agents. Patient is ambulatory to the emergency department comfortable, nontoxic in no acute distress. No other complaints or concerns at this time. ROS   Pertinent positives and negatives are stated within HPI, all other systems reviewed and are negative. Past Medical History:  has a past medical history of Anemia, Asthma, Depression with anxiety, Hussain-Danlos syndrome, Hypothyroid, and Thyroid disease.     Surgical History:  has a past surgical history that includes Tonsillectomy; skin biopsy (cyst removed left wrist); Wrist Arthrocentesis; Total abdominal hysterectomy w/ bilateral salpingoophorectomy (Bilateral, 10/17/2016); and Hysterectomy. Social History:  reports that she quit smoking about 11 years ago. She has a 2.50 pack-year smoking history. She has never used smokeless tobacco. She reports current alcohol use of about 2.0 standard drinks per week. She reports that she does not use drugs. Family History: family history includes Arthritis in her mother; Diabetes in her mother; Heart Disease in her maternal grandfather and paternal grandfather; No Known Problems in her father. Allergies: Patient has no known allergies. Physical Exam   Oxygen Saturation Interpretation: Normal.        ED Triage Vitals   BP Temp Temp src Heart Rate Resp SpO2 Height Weight   03/01/23 1711 03/01/23 1551 -- 03/01/23 1551 03/01/23 1551 03/01/23 1551 -- 03/01/23 1711   (!) 128/58 97.7 °F (36.5 °C)  (!) 139 18 99 %  187 lb (84.8 kg)         Constitutional:  Alert, development consistent with age. Neck:  Normal ROM. Supple. Non-tender. Physical Exam  Left Wrist: diffusely across carpal bones and navicular/scaphoid. Positive snuffbox tenderness            Tenderness:  moderate. Swelling: None. Deformity: no deformity observed/palpated. ROM: full range with pain. Skin:  no wounds, erythema, or swelling. Neurovascular: Motor deficit: none. Sensory deficit:   none. Pulse deficit: none. Capillary refill: normal.  Left Elbow: diffusely across elbow            Tenderness: none              Swelling: None. Deformity: no deformity observed/palpated. ROM: full range of motion. Skin:  no wounds, erythema, or swelling.   Left Hand: all metacarpals             Tenderness: mild              Swelling: None.            Deformity: no deformity observed/palpated. ROM: full range of motion. Skin:  no wounds, erythema, or swelling. Lymphatics: No lymphangitis or adenopathy noted. Neurological:  Oriented. Motor functions intact. Lab / Imaging Results   (All laboratory and radiology results have been personally reviewed by myself)  Labs:  No results found for this visit on 03/01/23. Imaging: All Radiology results interpreted by Radiologist unless otherwise noted. XR RADIUS ULNA LEFT (2 VIEWS)   Final Result   No acute osseous abnormality. XR WRIST LEFT (MIN 3 VIEWS)   Final Result   Normal wrist radiographs         XR HAND LEFT (MIN 3 VIEWS)   Final Result   No acute osseous abnormality. ED Course / Medical Decision Making     Medications   ibuprofen (ADVIL;MOTRIN) tablet 600 mg (600 mg Oral Given 3/1/23 1825)        Consult:   none    Procedure(s):  None    Medical Decision Making    Patient presents to the ER for left wrist, hand and forearm pain. Patient states she was walking down her stairs when she missed the last step, and tripped and fell onto an outstretched left hand. Denies head injury or loss of consciousness. She denies numbness, weakness or paresthesias. Social Determinants include   Social Connections: Not on file    Social Determinants : None. Chronic conditions    Past Medical History:   Diagnosis Date    Anemia no medication    Asthma     Depression with anxiety     Hussain-Danlos syndrome     Hypothyroid     Thyroid disease no medication   . Physical exam patient has tenderness diffusely through the carpal bones. She does have positive snuffbox tenderness. 2+ palpable radial pulses bilaterally. Cap refill less than 2 seconds. Patient is neurovascularly intact. There is no chest wall tenderness, crepitus upon palpation. Abdomen is soft, nontender. There is no guarding, rebound or rigidity. Lungs are clear and equal bilaterally. Heart rate regular rate and rhythm. .  Vital signs are within normal limits. Patient is afebrile, not hypoxic or tachycardic. Darius Cast Differential diagnoses include but not limited to scaphoid fracture, wrist sprain radius and ulna fracture. Diagnostic studies revealed x-ray of the left radius and ulna show no acute osseous abnormality. X-ray of the left wrist shows normal wrist radiographs. X-ray of the left hand showed no acute osseous abnormalities. Patient was placed in a Velcro thumb spica due to the snuffbox tenderness, and advised to follow-up in 7 to 10 days with orthopedics for repeat x-ray to rule out scaphoid fracture. Consults included none. Results were discussed with the patient, she verbalized understanding. Patient advised to use rest, ice compression elevation to the area. .  Patient was given p.o. ibuprofen for their symptoms with mild improvement. Patient will be discharged home with the following prescriptions, ibuprofen. Discussed appropriate use and potential side effects of starting the prescribed medications. Patient continues to be non-toxic on re-evaluation. Findings were discussed with the patient and reasons to immediately return to the ED were articulated to them. They will follow-up with their PMD and orthopedics. Patient verbalized understanding, is agreeable to this plan and follow-up. Darius Cast       Discharge Instructions:   Patient referred to  Surya Ojeda, DO  [address]  [phone]    In 3 days  For follow up    Jason Lynn, [address] [place] [address] [place]  [phone]    Schedule an appointment as soon as possible for a visit in 3 days  For follow up    MEDICATIONS:   DISCHARGE MEDICATIONS:  Discharge Medication List as of 3/1/2023  8:15 PM        START taking these medications    Details   ibuprofen (ADVIL;MOTRIN) 600 MG tablet Take 1 tablet by mouth 3 times daily as needed for Pain, Disp-30 tablet, R-0Normal             DISCONTINUED MEDICATIONS:  Discharge Medication List as of 3/1/2023  8:15 PM          Record Review:  Records Reviewed : None       Disposition Considerations: This patient's ED course included: a personal history and physicial examination and re-evaluation prior to disposition  This patient has remained hemodynamically stable during their ED course. I emphasized the importance of follow-up with the physician I referred them to in the timeframe recommended. I discussed with the patient emergent symptoms and the need to immediately return to the ER. Written information was included in their discharge instructions. Additional verbal discharge instructions were also given and discussed with the patient to supplement those generated by the EMR. We also discussed medications that were prescribed  (if any) including common side effects and interactions. The patient was advised to abstain from driving, operating heavy machinery or making significant decisions while taking medications such as opiates and muscle relaxers that may impair this. All questions were addressed. They understand return precautions and discharge instructions. The patient  expressed understanding. Vitals were stable and they were in no distress at discharge. 1. Injury of left upper extremity, initial encounter    2. Tenderness of anatomical snuffbox    3. Injury of left wrist, initial encounter      Plan   Disposition:   Discharged home. Patient condition is good    New Medications     Discharge Medication List as of 3/1/2023  8:15 PM        START taking these medications    Details   ibuprofen (ADVIL;MOTRIN) 600 MG tablet Take 1 tablet by mouth 3 times daily as needed for Pain, Disp-30 tablet, R-0Normal           Electronically signed by LEIA Rausch CNP   DD: 3/1/23  **This report was transcribed using voice recognition software.  Every effort was made to ensure accuracy; however, inadvertent computerized transcription errors may be present.   END OF ED PROVIDER NOTE        Darren Jacome, APRCHAYO - CNP  03/01/23 1630

## 2023-09-07 ENCOUNTER — APPOINTMENT (OUTPATIENT)
Dept: CT IMAGING | Age: 38
End: 2023-09-07
Payer: MEDICAID

## 2023-09-07 ENCOUNTER — HOSPITAL ENCOUNTER (EMERGENCY)
Age: 38
Discharge: HOME | End: 2023-09-08
Attending: EMERGENCY MEDICINE
Payer: MEDICAID

## 2023-09-07 VITALS
OXYGEN SATURATION: 100 % | SYSTOLIC BLOOD PRESSURE: 120 MMHG | DIASTOLIC BLOOD PRESSURE: 63 MMHG | HEART RATE: 91 BPM | RESPIRATION RATE: 18 BRPM | TEMPERATURE: 98.1 F | HEIGHT: 70 IN | BODY MASS INDEX: 25.77 KG/M2 | WEIGHT: 180 LBS

## 2023-09-07 DIAGNOSIS — N30.01 ACUTE CYSTITIS WITH HEMATURIA: ICD-10-CM

## 2023-09-07 DIAGNOSIS — R10.9 ABDOMINAL PAIN, UNSPECIFIED ABDOMINAL LOCATION: Primary | ICD-10-CM

## 2023-09-07 LAB
ALBUMIN SERPL-MCNC: 4.6 G/DL (ref 3.5–5.2)
ALP SERPL-CCNC: 68 U/L (ref 35–104)
ALT SERPL-CCNC: 12 U/L (ref 0–32)
ANION GAP SERPL CALCULATED.3IONS-SCNC: 10 MMOL/L (ref 7–16)
AST SERPL-CCNC: 16 U/L (ref 0–31)
BACTERIA URNS QL MICRO: ABNORMAL
BASOPHILS # BLD: 0.05 K/UL (ref 0–0.2)
BASOPHILS NFR BLD: 1 % (ref 0–2)
BILIRUB SERPL-MCNC: 0.2 MG/DL (ref 0–1.2)
BILIRUB UR QL STRIP: NEGATIVE
BUN SERPL-MCNC: 12 MG/DL (ref 6–20)
CALCIUM SERPL-MCNC: 9.2 MG/DL (ref 8.6–10.2)
CHLORIDE SERPL-SCNC: 104 MMOL/L (ref 98–107)
CLARITY UR: ABNORMAL
CO2 SERPL-SCNC: 27 MMOL/L (ref 22–29)
COLOR UR: YELLOW
CREAT SERPL-MCNC: 0.8 MG/DL (ref 0.5–1)
EOSINOPHIL # BLD: 0.22 K/UL (ref 0.05–0.5)
EOSINOPHILS RELATIVE PERCENT: 2 % (ref 0–6)
EPI CELLS #/AREA URNS HPF: ABNORMAL /HPF
ERYTHROCYTE [DISTWIDTH] IN BLOOD BY AUTOMATED COUNT: 12.5 % (ref 11.5–15)
GFR SERPL CREATININE-BSD FRML MDRD: >60 ML/MIN/1.73M2
GLUCOSE SERPL-MCNC: 101 MG/DL (ref 74–99)
GLUCOSE UR STRIP-MCNC: NEGATIVE MG/DL
HCT VFR BLD AUTO: 41.7 % (ref 34–48)
HGB BLD-MCNC: 14 G/DL (ref 11.5–15.5)
HGB UR QL STRIP.AUTO: NEGATIVE
IMM GRANULOCYTES # BLD AUTO: 0.03 K/UL (ref 0–0.58)
IMM GRANULOCYTES NFR BLD: 0 % (ref 0–5)
KETONES UR STRIP-MCNC: ABNORMAL MG/DL
LACTATE BLDV-SCNC: 0.8 MMOL/L (ref 0.5–2.2)
LEUKOCYTE ESTERASE UR QL STRIP: ABNORMAL
LIPASE SERPL-CCNC: 30 U/L (ref 13–60)
LYMPHOCYTES NFR BLD: 3.45 K/UL (ref 1.5–4)
LYMPHOCYTES RELATIVE PERCENT: 33 % (ref 20–42)
MCH RBC QN AUTO: 30.7 PG (ref 26–35)
MCHC RBC AUTO-ENTMCNC: 33.6 G/DL (ref 32–34.5)
MCV RBC AUTO: 91.4 FL (ref 80–99.9)
MONOCYTES NFR BLD: 0.61 K/UL (ref 0.1–0.95)
MONOCYTES NFR BLD: 6 % (ref 2–12)
NEUTROPHILS NFR BLD: 58 % (ref 43–80)
NEUTS SEG NFR BLD: 6.12 K/UL (ref 1.8–7.3)
NITRITE UR QL STRIP: NEGATIVE
PH UR STRIP: 6.5 [PH] (ref 5–9)
PLATELET # BLD AUTO: 195 K/UL (ref 130–450)
PMV BLD AUTO: 10.6 FL (ref 7–12)
POTASSIUM SERPL-SCNC: 3.7 MMOL/L (ref 3.5–5)
PROT SERPL-MCNC: 7.2 G/DL (ref 6.4–8.3)
PROT UR STRIP-MCNC: NEGATIVE MG/DL
RBC # BLD AUTO: 4.56 M/UL (ref 3.5–5.5)
RBC #/AREA URNS HPF: ABNORMAL /HPF
SODIUM SERPL-SCNC: 141 MMOL/L (ref 132–146)
SP GR UR STRIP: 1.02 (ref 1–1.03)
UROBILINOGEN UR STRIP-ACNC: 0.2 EU/DL (ref 0–1)
WBC #/AREA URNS HPF: ABNORMAL /HPF
WBC OTHER # BLD: 10.5 K/UL (ref 4.5–11.5)

## 2023-09-07 PROCEDURE — 6360000004 HC RX CONTRAST MEDICATION: Performed by: RADIOLOGY

## 2023-09-07 PROCEDURE — 83605 ASSAY OF LACTIC ACID: CPT

## 2023-09-07 PROCEDURE — 80053 COMPREHEN METABOLIC PANEL: CPT

## 2023-09-07 PROCEDURE — 85025 COMPLETE CBC W/AUTO DIFF WBC: CPT

## 2023-09-07 PROCEDURE — 96375 TX/PRO/DX INJ NEW DRUG ADDON: CPT

## 2023-09-07 PROCEDURE — 83690 ASSAY OF LIPASE: CPT

## 2023-09-07 PROCEDURE — 99285 EMERGENCY DEPT VISIT HI MDM: CPT

## 2023-09-07 PROCEDURE — 6360000002 HC RX W HCPCS: Performed by: EMERGENCY MEDICINE

## 2023-09-07 PROCEDURE — 81001 URINALYSIS AUTO W/SCOPE: CPT

## 2023-09-07 PROCEDURE — 74177 CT ABD & PELVIS W/CONTRAST: CPT

## 2023-09-07 PROCEDURE — 96374 THER/PROPH/DIAG INJ IV PUSH: CPT

## 2023-09-07 PROCEDURE — 2580000003 HC RX 258: Performed by: EMERGENCY MEDICINE

## 2023-09-07 PROCEDURE — 87086 URINE CULTURE/COLONY COUNT: CPT

## 2023-09-07 RX ORDER — CEFDINIR 300 MG/1
300 CAPSULE ORAL 2 TIMES DAILY
Qty: 10 CAPSULE | Refills: 0 | Status: SHIPPED | OUTPATIENT
Start: 2023-09-07 | End: 2023-09-12

## 2023-09-07 RX ORDER — POLYETHYLENE GLYCOL 3350 17 G/17G
17 POWDER, FOR SOLUTION ORAL DAILY
Qty: 510 G | Refills: 0 | Status: SHIPPED | OUTPATIENT
Start: 2023-09-07 | End: 2023-10-07

## 2023-09-07 RX ORDER — ONDANSETRON 2 MG/ML
4 INJECTION INTRAMUSCULAR; INTRAVENOUS ONCE
Status: COMPLETED | OUTPATIENT
Start: 2023-09-07 | End: 2023-09-07

## 2023-09-07 RX ORDER — KETOROLAC TROMETHAMINE 30 MG/ML
30 INJECTION, SOLUTION INTRAMUSCULAR; INTRAVENOUS ONCE
Status: COMPLETED | OUTPATIENT
Start: 2023-09-07 | End: 2023-09-07

## 2023-09-07 RX ORDER — DICYCLOMINE HYDROCHLORIDE 10 MG/1
10 CAPSULE ORAL 4 TIMES DAILY
Qty: 120 CAPSULE | Refills: 0 | Status: SHIPPED | OUTPATIENT
Start: 2023-09-07

## 2023-09-07 RX ORDER — CEFDINIR 300 MG/1
300 CAPSULE ORAL ONCE
Status: COMPLETED | OUTPATIENT
Start: 2023-09-08 | End: 2023-09-08

## 2023-09-07 RX ORDER — 0.9 % SODIUM CHLORIDE 0.9 %
1000 INTRAVENOUS SOLUTION INTRAVENOUS ONCE
Status: COMPLETED | OUTPATIENT
Start: 2023-09-07 | End: 2023-09-07

## 2023-09-07 RX ADMIN — SODIUM CHLORIDE 1000 ML: 9 INJECTION, SOLUTION INTRAVENOUS at 22:36

## 2023-09-07 RX ADMIN — IOPAMIDOL 75 ML: 755 INJECTION, SOLUTION INTRAVENOUS at 23:22

## 2023-09-07 RX ADMIN — KETOROLAC TROMETHAMINE 30 MG: 30 INJECTION, SOLUTION INTRAMUSCULAR; INTRAVENOUS at 22:35

## 2023-09-07 RX ADMIN — ONDANSETRON 4 MG: 2 INJECTION INTRAMUSCULAR; INTRAVENOUS at 22:34

## 2023-09-07 ASSESSMENT — PAIN DESCRIPTION - ORIENTATION: ORIENTATION: RIGHT

## 2023-09-07 ASSESSMENT — PAIN - FUNCTIONAL ASSESSMENT: PAIN_FUNCTIONAL_ASSESSMENT: 0-10

## 2023-09-07 ASSESSMENT — PAIN DESCRIPTION - LOCATION: LOCATION: ABDOMEN

## 2023-09-07 ASSESSMENT — PAIN SCALES - GENERAL
PAINLEVEL_OUTOF10: 8
PAINLEVEL_OUTOF10: 5

## 2023-09-08 PROCEDURE — 6370000000 HC RX 637 (ALT 250 FOR IP): Performed by: EMERGENCY MEDICINE

## 2023-09-08 RX ADMIN — CEFDINIR 300 MG: 300 CAPSULE ORAL at 00:04

## 2023-09-08 NOTE — ED PROVIDER NOTES
[address]      Pt Name: Amber Del Valle  MRN: [id number]  [facility] 1985  Date of evaluation: 9/7/2023  Provider: Andriy Briones DO  PCP: Aubry Angelucci, DO  Note Started: 10:16 PM EDT 9/7/23    CHIEF COMPLAINT       Chief Complaint   Patient presents with    Abdominal Pain     Right sided abd pain for a few days, nausea, h/a       HISTORY OF PRESENT ILLNESS: 1 or more Elements   History From: Patient  Limitations to history : None    Amber Del Valle is a 43 y.o. female who presents to the [facility] for evaluation of abdominal pain. Patient states for about the past 2 days or so she has been experiencing pain to her right side of her abdomen. Patient also does not endorse some nausea she denies any fevers chills or previous abdominal surgeries other than hysterectomy. Patient has no diarrhea and states she is having normal bowel movements. No other reported mitigating or worseing factors. Nursing Notes were all reviewed and agreed with or any disagreements were addressed in the HPI. REVIEW OF SYSTEMS :    Positives and Pertinent negatives as per HPI.      SURGICAL HISTORY     Past Surgical History:   Procedure Laterality Date    HYSTERECTOMY (CERVIX STATUS UNKNOWN)      SKIN BIOPSY  cyst removed left wrist    MARLINE AND BSO (CERVIX REMOVED) Bilateral 10/17/2016    Robotic assisted    TONSILLECTOMY      WRIST ARTHROCENTESIS         CURRENTMEDICATIONS       Discharge Medication List as of 9/8/2023 12:19 AM        CONTINUE these medications which have NOT CHANGED    Details   ibuprofen (ADVIL;MOTRIN) 600 MG tablet Take 1 tablet by mouth 3 times daily as needed for Pain, Disp-30 tablet, R-0Normal      albuterol sulfate HFA (PROVENTIL HFA) 108 (90 Base) MCG/ACT inhaler Inhale 2 puffs into the lungs every 6 hours as needed for Wheezing or Shortness of Breath, Disp-1 each, R-0Normal      benzonatate (TESSALON) 200 MG

## 2023-09-09 LAB
MICROORGANISM SPEC CULT: ABNORMAL
SPECIMEN DESCRIPTION: ABNORMAL

## 2023-10-11 ENCOUNTER — HOSPITAL ENCOUNTER (OUTPATIENT)
Dept: ULTRASOUND IMAGING | Age: 38
Discharge: HOME | End: 2023-10-11
Payer: MEDICAID

## 2023-10-11 DIAGNOSIS — K20.90 GASTROESOPHAGITIS: ICD-10-CM

## 2023-10-11 DIAGNOSIS — K82.9 DISEASE OF GALLBLADDER: ICD-10-CM

## 2023-10-11 DIAGNOSIS — K29.70 GASTROESOPHAGITIS: ICD-10-CM

## 2023-10-11 PROCEDURE — 76705 ECHO EXAM OF ABDOMEN: CPT

## 2024-05-21 ENCOUNTER — HOSPITAL ENCOUNTER (EMERGENCY)
Age: 39
Discharge: HOME | End: 2024-05-21
Payer: MEDICAID

## 2024-05-21 VITALS
OXYGEN SATURATION: 100 % | RESPIRATION RATE: 16 BRPM | TEMPERATURE: 98.1 F | BODY MASS INDEX: 26.54 KG/M2 | WEIGHT: 185 LBS | HEART RATE: 87 BPM | SYSTOLIC BLOOD PRESSURE: 112 MMHG | DIASTOLIC BLOOD PRESSURE: 59 MMHG

## 2024-05-21 DIAGNOSIS — J02.0 STREPTOCOCCAL SORE THROAT: Primary | ICD-10-CM

## 2024-05-21 LAB
SPECIMEN SOURCE: ABNORMAL
STREP A, MOLECULAR: POSITIVE

## 2024-05-21 PROCEDURE — 99211 OFF/OP EST MAY X REQ PHY/QHP: CPT

## 2024-05-21 PROCEDURE — 87651 STREP A DNA AMP PROBE: CPT

## 2024-05-21 RX ORDER — AMOXICILLIN 500 MG/1
500 CAPSULE ORAL 2 TIMES DAILY
Qty: 20 CAPSULE | Refills: 0 | Status: SHIPPED | OUTPATIENT
Start: 2024-05-21 | End: 2024-05-31

## 2024-05-21 RX ORDER — LIDOCAINE HYDROCHLORIDE 20 MG/ML
5 SOLUTION OROPHARYNGEAL
Qty: 100 ML | Refills: 0 | Status: SHIPPED | OUTPATIENT
Start: 2024-05-21

## 2024-05-21 ASSESSMENT — PAIN - FUNCTIONAL ASSESSMENT: PAIN_FUNCTIONAL_ASSESSMENT: 0-10

## 2024-05-21 ASSESSMENT — PAIN SCALES - GENERAL: PAINLEVEL_OUTOF10: 0

## 2024-05-21 NOTE — DISCHARGE INSTRUCTIONS
Swollen twice daily for 10 days, please make sure that you complete this medication.  Salt water gargles, Tylenol or ibuprofen as needed for pain.  Viscous lidocaine every 3 hours as needed for irritation.  If you develop any difficulty breathing, difficulty swallowing, drooling please go to the ER.  Otherwise follow-up with your PCP.  Throw away toothbrush after the first 24 hours of treatment and then again at the end of treatment.

## 2024-05-21 NOTE — ED PROVIDER NOTES
[facility]  Department of Emergency Medicine     Encounter Note  Admit Date/RoomTime: 2024  9:25 AM   Room:     NAME: Aracelis Garnica  : 1985  MRN: [id number]     Chief Complaint:  Pharyngitis    History of Present Illness       Aracelis Garnica is a 39 y.o. old female who presents to the urgent care with complaints of a 3-day history of a sore throat.  Patient states on  her symptoms started out with a mild itching in her throat, the sore throat has progressively worsened, today states that she was having such severe pain in her throat that everything hurts to try to swallow.  She is managing to tolerate food and fluids.  She has been using Tylenol and ibuprofen and Chloraseptic throat spray.  States that she has been using some as Chloraseptic throat spray that it does not seem to be helping anymore.  No fevers.  No known sick contacts however [facility]  Denies any other constitutional symptoms, has a mild cough due to the irritation but states that she feels it is due to the soreness.  No chest pain, shortness of breath, nausea, vomiting, diarrhea, rashes.  ROS   Pertinent positives and negatives are stated within HPI, all other systems reviewed and are negative.    Past Medical History:  has a past medical history of Anemia, Asthma, Depression with anxiety, Hussain-Danlos syndrome, Hypothyroid, and Thyroid disease.    Surgical History:  has a past surgical history that includes Tonsillectomy; skin biopsy (cyst removed left wrist); Wrist Arthrocentesis; Total abdominal hysterectomy w/ bilateral salpingoophorectomy (Bilateral, 10/17/2016); and Hysterectomy.    Social History:  reports that she has been smoking cigarettes. She started smoking about 23 years ago. She has a 2.5 pack-year smoking history. She has never used smokeless tobacco. She reports current alcohol use of about 2.0 standard drinks of alcohol per week. She